# Patient Record
Sex: FEMALE | Race: BLACK OR AFRICAN AMERICAN | NOT HISPANIC OR LATINO | Employment: UNEMPLOYED | URBAN - NONMETROPOLITAN AREA
[De-identification: names, ages, dates, MRNs, and addresses within clinical notes are randomized per-mention and may not be internally consistent; named-entity substitution may affect disease eponyms.]

---

## 2024-09-26 ENCOUNTER — HOSPITAL ENCOUNTER (EMERGENCY)
Facility: HOSPITAL | Age: 29
Discharge: OTHER INSTITUTION | End: 2024-09-26
Attending: EMERGENCY MEDICINE
Payer: COMMERCIAL

## 2024-09-26 VITALS
OXYGEN SATURATION: 96 % | DIASTOLIC BLOOD PRESSURE: 62 MMHG | SYSTOLIC BLOOD PRESSURE: 105 MMHG | RESPIRATION RATE: 18 BRPM | WEIGHT: 120 LBS | HEART RATE: 67 BPM | BODY MASS INDEX: 19.29 KG/M2 | TEMPERATURE: 98.4 F | HEIGHT: 66 IN

## 2024-09-26 DIAGNOSIS — R10.9 ABDOMINAL PAIN, UNSPECIFIED ABDOMINAL LOCATION: Primary | ICD-10-CM

## 2024-09-26 PROCEDURE — 99281 EMR DPT VST MAYX REQ PHY/QHP: CPT

## 2024-09-26 RX ORDER — METRONIDAZOLE 500 MG/1
500 TABLET ORAL 3 TIMES DAILY
COMMUNITY
Start: 2024-09-25

## 2024-09-26 RX ORDER — B-COMPLEX WITH VITAMIN C
1 TABLET ORAL
COMMUNITY
Start: 2024-07-18

## 2024-09-26 RX ORDER — CYCLOBENZAPRINE HCL 5 MG
5 TABLET ORAL 3 TIMES DAILY
COMMUNITY
Start: 2024-09-25

## 2024-09-26 RX ORDER — TERCONAZOLE 4 MG/G
1 CREAM VAGINAL NIGHTLY
COMMUNITY
Start: 2024-09-25

## 2024-09-26 ASSESSMENT — PAIN DESCRIPTION - ORIENTATION: ORIENTATION: RIGHT;LEFT

## 2024-09-26 ASSESSMENT — PAIN - FUNCTIONAL ASSESSMENT: PAIN_FUNCTIONAL_ASSESSMENT: 0-10

## 2024-09-26 ASSESSMENT — PAIN DESCRIPTION - DESCRIPTORS: DESCRIPTORS: CRAMPING

## 2024-09-26 ASSESSMENT — PAIN DESCRIPTION - PAIN TYPE: TYPE: ACUTE PAIN

## 2024-09-26 ASSESSMENT — PAIN DESCRIPTION - ONSET: ONSET: ONGOING

## 2024-09-26 ASSESSMENT — COLUMBIA-SUICIDE SEVERITY RATING SCALE - C-SSRS
6. HAVE YOU EVER DONE ANYTHING, STARTED TO DO ANYTHING, OR PREPARED TO DO ANYTHING TO END YOUR LIFE?: NO
1. IN THE PAST MONTH, HAVE YOU WISHED YOU WERE DEAD OR WISHED YOU COULD GO TO SLEEP AND NOT WAKE UP?: NO
2. HAVE YOU ACTUALLY HAD ANY THOUGHTS OF KILLING YOURSELF?: NO

## 2024-09-26 ASSESSMENT — PAIN SCALES - GENERAL: PAINLEVEL_OUTOF10: 7

## 2024-09-26 ASSESSMENT — PAIN DESCRIPTION - FREQUENCY: FREQUENCY: INTERMITTENT

## 2024-09-26 ASSESSMENT — PAIN DESCRIPTION - LOCATION: LOCATION: ABDOMEN

## 2024-09-26 NOTE — ED PROVIDER NOTES
"HPI   Chief Complaint   Patient presents with    Abdominal Pain     Pt brought in from intermediate for lower abdominal pain, pelvic pain, and lower back pain - pt 20 weeks pregnant , no issues in prior pregnancies. Pt states this started yesterday and was seen at [facility] and was told she was beginning to dilate. Pt states she has had light pink spotting on and off since yesterday. Pt denies Urinary s/s, pt denies recent sexual intercourse, pt states she has not needed Rhogam in the past.        Patient presents to the emergency department by squad car from intermediate accompanied by police secondary to diffuse abdominal and back pain.  The patient is approximately 20 weeks gestation.  She is a  5 para 4.  She states that she was actually at [facility] yesterday for similar symptoms and she was told that she had \"uterine contractions\" and was discharged according to the patient.  I do not have these records available.  The patient had continued symptoms today and was taken into custody after a court appearance apparently, and now presents from intermediate to be further evaluated.  The patient denies dysuria, vaginal bleeding, vaginal fluid leakage, or other symptoms.      History provided by:  Patient and police   used: No            Patient History   History reviewed. No pertinent past medical history.  History reviewed. No pertinent surgical history.  No family history on file.  Social History     Tobacco Use    Smoking status: Never    Smokeless tobacco: Never   Vaping Use    Vaping status: Never Used   Substance Use Topics    Alcohol use: Not Currently    Drug use: Never       Physical Exam   ED Triage Vitals [24 1520]   Temperature Heart Rate Respirations BP   36.9 °C (98.4 °F) 83 18 104/73      Pulse Ox Temp Source Heart Rate Source Patient Position   100 % Temporal Monitor --      BP Location FiO2 (%)     -- --       Physical Exam  Vitals and nursing note reviewed. "   Constitutional:       General: She is not in acute distress.     Appearance: Normal appearance. She is normal weight. She is not ill-appearing, toxic-appearing or diaphoretic.      Comments: I witnessed the patient ambulate in from the ambulance bay without difficulty.  She is lying in bed resting comfortably at this time.  Please are at bedside.   HENT:      Head: Normocephalic and atraumatic.      Nose: Nose normal. No rhinorrhea.   Neck:      Comments: Trachea is midline  Cardiovascular:      Rate and Rhythm: Normal rate and regular rhythm.      Heart sounds: No murmur heard.  Pulmonary:      Effort: Pulmonary effort is normal.      Breath sounds: Normal breath sounds. No wheezing.   Abdominal:      General: Abdomen is flat. Bowel sounds are normal. There is no distension.      Palpations: Abdomen is soft.      Tenderness: There is generalized abdominal tenderness. There is right CVA tenderness and left CVA tenderness. There is no guarding or rebound. Negative signs include Alonzo's sign.   Genitourinary:     Uterus: Enlarged.    Musculoskeletal:         General: Normal range of motion.      Cervical back: Normal range of motion.   Skin:     General: Skin is warm and dry.      Findings: No rash.   Neurological:      General: No focal deficit present.      Mental Status: She is alert and oriented to person, place, and time. Mental status is at baseline.   Psychiatric:         Mood and Affect: Mood normal.         Behavior: Behavior normal.         Thought Content: Thought content normal.         Judgment: Judgment normal.           ED Course & MDM   Diagnoses as of 09/26/24 1532   Abdominal pain, unspecified abdominal location                 No data recorded     [place] Coma Scale Score: 15 (09/26/24 1520 : Yue Howard RN)                           Medical Decision Making  Fetal heart tones are in the 150 range.  The patient is hemodynamically stable.  The patient's clinical presentation is not consistent with  acute appendicitis, cholecystitis, or bowel obstruction.  Given the patient's advanced gestational age I feel she needs to be evaluated at a center with obstructive goal services which we do not have here.  I spoke to the transfer line for [facility] who in turn spoke to [facility]/Dr. Ortiz who is covering for OB.  They agreed that the patient should be transferred to their facility and taken to their OB unit.  At this point I feel the patient is appropriate for transfer by private car with the police as opposed to waiting for ambulance transport which would certainly delay definitive treatment.  Dr. Ortiz is the accepting physician.  Patient will be transferred in stable condition.        Procedure  Procedures     Curly Chase, DO  09/26/24 1535